# Patient Record
Sex: MALE | Race: ASIAN | NOT HISPANIC OR LATINO
[De-identification: names, ages, dates, MRNs, and addresses within clinical notes are randomized per-mention and may not be internally consistent; named-entity substitution may affect disease eponyms.]

---

## 2020-08-19 ENCOUNTER — APPOINTMENT (OUTPATIENT)
Dept: ORTHOPEDIC SURGERY | Facility: CLINIC | Age: 40
End: 2020-08-19

## 2020-08-19 PROBLEM — Z00.00 ENCOUNTER FOR PREVENTIVE HEALTH EXAMINATION: Status: ACTIVE | Noted: 2020-08-19

## 2021-12-18 ENCOUNTER — EMERGENCY (EMERGENCY)
Facility: HOSPITAL | Age: 41
LOS: 1 days | Discharge: HOME | End: 2021-12-18
Attending: EMERGENCY MEDICINE
Payer: MEDICAID

## 2021-12-18 VITALS
TEMPERATURE: 98 F | SYSTOLIC BLOOD PRESSURE: 143 MMHG | RESPIRATION RATE: 18 BRPM | OXYGEN SATURATION: 98 % | HEART RATE: 97 BPM | HEIGHT: 71 IN | WEIGHT: 175.05 LBS | DIASTOLIC BLOOD PRESSURE: 89 MMHG

## 2021-12-18 PROCEDURE — 70450 CT HEAD/BRAIN W/O DYE: CPT | Mod: MA

## 2021-12-18 PROCEDURE — 90471 IMMUNIZATION ADMIN: CPT

## 2021-12-18 PROCEDURE — 99284 EMERGENCY DEPT VISIT MOD MDM: CPT

## 2021-12-18 PROCEDURE — 70450 CT HEAD/BRAIN W/O DYE: CPT | Mod: 26,MA

## 2021-12-18 PROCEDURE — 90715 TDAP VACCINE 7 YRS/> IM: CPT

## 2021-12-18 PROCEDURE — 99284 EMERGENCY DEPT VISIT MOD MDM: CPT | Mod: 25

## 2021-12-18 RX ORDER — ONDANSETRON 8 MG/1
4 TABLET, FILM COATED ORAL ONCE
Refills: 0 | Status: COMPLETED | OUTPATIENT
Start: 2021-12-18 | End: 2021-12-18

## 2021-12-18 RX ORDER — ACETAMINOPHEN 500 MG
975 TABLET ORAL ONCE
Refills: 0 | Status: COMPLETED | OUTPATIENT
Start: 2021-12-18 | End: 2021-12-18

## 2021-12-18 RX ORDER — TETANUS TOXOID, REDUCED DIPHTHERIA TOXOID AND ACELLULAR PERTUSSIS VACCINE, ADSORBED 5; 2.5; 8; 8; 2.5 [IU]/.5ML; [IU]/.5ML; UG/.5ML; UG/.5ML; UG/.5ML
0.5 SUSPENSION INTRAMUSCULAR ONCE
Refills: 0 | Status: COMPLETED | OUTPATIENT
Start: 2021-12-18 | End: 2021-12-18

## 2021-12-18 RX ADMIN — Medication 975 MILLIGRAM(S): at 22:17

## 2021-12-18 RX ADMIN — ONDANSETRON 4 MILLIGRAM(S): 8 TABLET, FILM COATED ORAL at 22:17

## 2021-12-18 RX ADMIN — TETANUS TOXOID, REDUCED DIPHTHERIA TOXOID AND ACELLULAR PERTUSSIS VACCINE, ADSORBED 0.5 MILLILITER(S): 5; 2.5; 8; 8; 2.5 SUSPENSION INTRAMUSCULAR at 22:17

## 2021-12-18 NOTE — ED PROVIDER NOTE - NS ED ROS FT
Constitutional: no fevers; no chills  HEENT: no visual changes, no sore throat, no rhinorrhea  CV: no cp; no palpitations  Resp: no sob; no cough  GI: no abd pain, no nausea, no vomiting, no diarrhea, no constipation  : no dysuria, no hematuria  MSK: no myalgias; no arthralgias  skin: no rashes  neuro: HA, no numbness; no weakness, no tingling  endo: no polyuria, no polydipsia

## 2021-12-18 NOTE — ED PROVIDER NOTE - CLINICAL SUMMARY MEDICAL DECISION MAKING FREE TEXT BOX
Luiz Ro MD. pt presents c/o left head pain after weight barbell hit his head. pt denies use of ac or antiplatelets. +LOC. nausea and blurred vision but denies vomiting. denies numbness, weakness, or tingling of extremities. pt neurologically intact. has a small scalp hematoma w/ abrasion (no lac). no midline vertebral tenderness. FROM of neck. no other signs of trauma. will obtain CTH, adacel, analgesia, reassess

## 2021-12-18 NOTE — ED PROVIDER NOTE - NSFOLLOWUPINSTRUCTIONS_ED_ALL_ED_FT
There are many types of head injuries. They can be as minor as a bump. Some head injuries can be worse. Worse injuries include:    A strong hit to the head that hurts the brain (concussion).  A bruise of the brain (contusion). This means there is bleeding in the brain that can cause swelling.  A cracked skull (skull fracture).  Bleeding in the brain that gathers, gets thick (makes a clot), and forms a bump (hematoma).    Most problems from a head injury come in the first 24 hours. However, you may still have side effects up to 7–10 days after the injury. It is important to watch your condition for any changes.    Follow these instructions at home:  Medicines     Take Tylenol 1g every six hours and supplement with ibuprofen 600mg, with food, every six hours which can be taken three hours apart from the Tylenol to have a layered effect   Have your child:    Rest as much as possible. Rest helps the brain heal.  Avoid activities that are hard or tiring.    Make sure you get enough sleep.  Limit activities that need a lot of thought or attention, such as:    Watching TV.  Playing memory games and puzzles.  Doing homework.  Working on the computer, social media, and texting.    Stay away from activities that could cause another head injury, such as:    Riding a bicycle.  Playing sports.  Playing in gym class or recess.    Ask your doctor when it is safe for you to return to normal activities. You can follow up with our Sports team (Follow up with [facility] at [address] [place] [address] [address], [place], [place], [address], Call [phone] and arrange an appointment. They are open on Tuesdays from 2pm-9pm, call ahead and arrange insurance and/or referral needs. Appointments are available Tuesdays from 2:00pm-8pm) or neurology (Follow up with your neurologist in 2-3 days or call our clinic at [phone].)    General instructions     Watch your condition carefully for symptoms that are new or getting worse. This is very important in the first 24 hours after the head injury.  Keep all follow-up visits. This is important.    Wear a seatbelt when in a moving vehicle.  Wear a helmet when:    Riding a bicycle.  Skiing.  Doing any other sport or activity that has a risk of injury.    Get help right away if:    You have a very bad (severe) headache that is not helped by medicine.  Clear or bloody fluid coming from the nose or ears.  Changes in vision.  Jerky movements that you cannot control.    Your symptoms get worse.  You throw up (vomit).  Your dizziness gets worse.  You cannot walk or do not have control over his or her arms or legs.  You pass out.  You are sleepier or have trouble staying awake.  Your will not eat or drink.  The black centers of your eyes (pupils) change in size compared to each other.  These symptoms may be an emergency. Do not wait to see if the symptoms will go away. Get medical help right away. Call your local emergency services (911 in the [place]

## 2021-12-18 NOTE — ED PROVIDER NOTE - ATTENDING CONTRIBUTION TO CARE
Benja Morrison MD, FACEP: In this physician's medical judgement based on clinical history and physical exam, patient with head injury without loss of consciousness. Moderate symptoms with mild nausea which subsided.   CN 2 normal sight, 3,4,6 eomi and eyelid movement normal, 5,7 normal gritting of teeth and opening of mouth and sensation to face, 8 normal hearing for patient, 9,10 normal swallowing and phonation, 11 normal shoulder shrug, 12 normal tongue movement and articulation, normal coordination, normal finger to nose, normal heel to shin, negative Romberg, no pronator drift, no gait instability, 5/5 strength in upper and lower extremities, normal sensory throughout, alert and oriented to person, place, time, and situation.   will offer analgesia, ct head and reassess  Will follow up on analgesia, imaging, reassess and disposition as clinically indicated.     *The above represents an initial assessment/impression. Please refer to progress notes for potential changes in patient clinical course*     ct head within normal limits   The patient was serially evaluated throughout emergency department course. There was no acute deterioration up to this time in the department. Patient has demonstrated clinical improvement and is stable, feels better at this time according to emergency department team. Agree with goals/plan of emergency department care as described in this physician's electronic medical record, including diagnostics, therapeutics and consultation as clinically warranted. Will discharge home with close outpatient follow up with primary care physician/provider and specialist if necessary. The patient and/or family was educated on concerning signs and features to return to the emergency department, in layman terms, including but not limited to: nausea, vomiting, fever, chills, persistent/worsening symptoms or any concerns at all. No immediate life threatening issues present on history, clinical exam, or any diagnostic evaluation. The patient is a safe disposition home, has capacity and insight into their condition, is ambulatory in the Emergency Department with no further questions and will follow up with their doctor(s) this week. Diagnosis, prognosis, natural history and treatment was discussed with patient and/or family. The patient and/or family were given the opportunity to ask questions and have them answered in full. The patient and/or family are with capacity and insight into the situation, treatment, risks, benefits, alternative therapies, and understand that they can ask any further questions if needed. Patient and/or family/guardian understands anticipatory guidance and was given strict return and follow up precautions. The patient and/or family/guardian has been informed, in layman terms, of all concerning signs and symptoms to return to Emergency Department, the necessity to follow up with the PMD/Clinic/follow up provided within 2-3 days was explained, and the patient and/or family/guardian reports understanding of above with capacity and insight. The patient and/or family/guardian were informed of any results of their tests and are were encouraged to follow up on the findings with their doctor as well as the need to inform their doctor of any results. The patient and/or family/guardian are aware of the need to follow up with repeat testing as applicable and report understanding of the above with capacity and insight. The patient and/or family/guardian was made aware of any pending test results at the time of discharge and of the need to call back for the final results a well as the need to inform their doctor of the results.

## 2021-12-18 NOTE — ED ADULT NURSE NOTE - OBJECTIVE STATEMENT
41 y male presents from home s/p head injury while working out. reports to dropping barbell onto head. unsure of amount of weight- reports to dizziness nausea without vomiting and HA. denies cp, sob, fevers, chills. a&ox3. skin warm and dry. breathing comfortably in bed- no distress. abdomen soft nondistended. safety maintained- bed locked in lowest position. vss.

## 2021-12-18 NOTE — ED PROVIDER NOTE - OBJECTIVE STATEMENT
40 yo M PMHX PUD, colonic polyps, presents to the ED c/o left sided head pain after a barbell hit the top of his head. He felt his left shoulder give out, causing the barbell to hit his head. He states he had LOC and felt very dazed afterwards. He is c/o nausea and blurry vision. He denies vomiting, neck pain, numbness, weakness tingling, abd pain, fevers, weakness, cp, sob. Pt denies use of anti-platelet or anticoagulant medications

## 2021-12-18 NOTE — ED PROVIDER NOTE - PHYSICAL EXAMINATION
PHYSICAL EXAM:  GENERAL: non-toxic appearing; in no respiratory distress  HEAD: on the left scalp, there is a small approximately 0.5cm x 0.5cm area of swelling and tenderness; small abrasion with NO laceration in the same area; no further bleeding;   NECK: No JVD; trachea midline; no midline cervical tenderness; FROM of neck  EYES: PERRL, EOMs intact b/l w/out deficits; normal conjunctiva  CHEST/LUNG: CTAB no wheezes/rhonchi/rales  HEART: RRR no murmur/gallops/rubs  ABDOMEN: +BS, soft, NT, ND  EXTREMITIES: No LE edema, +2 radial pulses b/l, +2 DP/PT pulses b/l  MUSCULOSKELETAL: FROM of all 4 extremities; no midline vertebral tenderness;   NERVOUS SYSTEM:  A&Ox3, No motor deficits or sensory deficits; CNII-XII intact; Speech is fluent and appropriate  SKIN:  Warm and dry as visualized
Home

## 2021-12-18 NOTE — ED PROVIDER NOTE - PATIENT PORTAL LINK FT
You can access the FollowMyHealth Patient Portal offered by [facility] by registering at the following website: [email or web address]. By joining [facility]’s FollowMyHealth portal, you will also be able to view your health information using other applications (apps) compatible with our system.

## 2022-10-27 ENCOUNTER — LABORATORY RESULT (OUTPATIENT)
Age: 42
End: 2022-10-27

## 2022-10-27 ENCOUNTER — APPOINTMENT (OUTPATIENT)
Dept: ENDOCRINOLOGY | Facility: CLINIC | Age: 42
End: 2022-10-27

## 2022-10-27 VITALS
DIASTOLIC BLOOD PRESSURE: 62 MMHG | TEMPERATURE: 97.8 F | HEART RATE: 98 BPM | WEIGHT: 190 LBS | SYSTOLIC BLOOD PRESSURE: 102 MMHG | OXYGEN SATURATION: 97 %

## 2022-10-27 PROCEDURE — 99204 OFFICE O/P NEW MOD 45 MIN: CPT

## 2022-10-27 NOTE — ASSESSMENT
[FreeTextEntry1] : #Hyperprolactinemia\par -Patient noted to have mild elevation in his prolactin level when he had his previous labs done in the setting of mild gynecomastia.  Patient denies any galactorrhea.\par -Given mild elevation up to 26, less likely that patient has a prolactinoma that is large enough that is causing his headaches.\par Plan:\par -We will recheck patient's labs at this time.  Ideal time to check his testosterone levels at 8 AM however patient is here in the office and would like to get his labs done.  We will check his levels today however if levels are low, will have patient recheck his testosterone level at 8 AM in the morning.\par -We will call patient with results\par -Advised patient not to take over-the-counter testosterone supplements as they are not FDA regulated and it is unclear how much testosterone is in them

## 2022-10-27 NOTE — HISTORY OF PRESENT ILLNESS
[FreeTextEntry1] : #Hyperprolactinemia\par -Patient states that he has been having some increased breast tissue that he has noticed bilaterally.  Denies any discharge from the breast tissue.  Also mentioned that he was having some headaches recently for which she went to see his primary care doctor.  He states that he had some labs done recently which showed an elevated prolactin level for which she is here to follow-up.\par -Denies any pain at his breast.  States that it just cosmetically is bothering him.\par -He does mention some decreased libido, decreased energy recently.\par -He has 2 children\par -was trying testosterone supplements after noted to have low testo however has been off of it for over a month\par \par FSH 4.7\par LH 4.6\par Estradiol 20.6\par Prolactin 27.1\par Testosterone 284\par Free testosterone 71.9\par \par \par

## 2022-10-31 ENCOUNTER — NON-APPOINTMENT (OUTPATIENT)
Age: 42
End: 2022-10-31

## 2022-11-01 ENCOUNTER — NON-APPOINTMENT (OUTPATIENT)
Age: 42
End: 2022-11-01

## 2022-11-01 LAB
ANION GAP SERPL CALC-SCNC: 15 MMOL/L
BASOPHILS # BLD AUTO: 0.04 K/UL
BASOPHILS NFR BLD AUTO: 0.6 %
BUN SERPL-MCNC: 9 MG/DL
CALCIUM SERPL-MCNC: 10.3 MG/DL
CHLORIDE SERPL-SCNC: 101 MMOL/L
CO2 SERPL-SCNC: 25 MMOL/L
CREAT SERPL-MCNC: 0.91 MG/DL
EGFR: 108 ML/MIN/1.73M2
EOSINOPHIL # BLD AUTO: 0.19 K/UL
EOSINOPHIL NFR BLD AUTO: 2.7 %
FSH SERPL-MCNC: 5.2 IU/L
GLUCOSE SERPL-MCNC: 122 MG/DL
HCT VFR BLD CALC: 46.7 %
HGB BLD-MCNC: 15.1 G/DL
IMM GRANULOCYTES NFR BLD AUTO: 0.3 %
LH SERPL-ACNC: 7.9 IU/L
LYMPHOCYTES # BLD AUTO: 2.65 K/UL
LYMPHOCYTES NFR BLD AUTO: 37.1 %
MAN DIFF?: NORMAL
MCHC RBC-ENTMCNC: 27.5 PG
MCHC RBC-ENTMCNC: 32.3 GM/DL
MCV RBC AUTO: 85.1 FL
MONOCYTES # BLD AUTO: 0.63 K/UL
MONOCYTES NFR BLD AUTO: 8.8 %
NEUTROPHILS # BLD AUTO: 3.61 K/UL
NEUTROPHILS NFR BLD AUTO: 50.5 %
PLATELET # BLD AUTO: 354 K/UL
POTASSIUM SERPL-SCNC: 4.2 MMOL/L
PROLACTIN SERPL-MCNC: 24.6 NG/ML
RBC # BLD: 5.49 M/UL
RBC # FLD: 14.5 %
SHBG SERPL-SCNC: 14.8 NMOL/L
SODIUM SERPL-SCNC: 141 MMOL/L
T4 FREE SERPL-MCNC: 1.2 NG/DL
TESTOST FREE SERPL-MCNC: 5.2 PG/ML
TESTOST SERPL-MCNC: 219 NG/DL
TSH SERPL-ACNC: 0.85 UIU/ML
WBC # FLD AUTO: 7.14 K/UL

## 2022-11-15 ENCOUNTER — NON-APPOINTMENT (OUTPATIENT)
Age: 42
End: 2022-11-15

## 2022-11-15 LAB
ACTH SER-ACNC: 36.4 PG/ML
ANION GAP SERPL CALC-SCNC: 14 MMOL/L
BUN SERPL-MCNC: 12 MG/DL
CALCIUM SERPL-MCNC: 9.8 MG/DL
CHLORIDE SERPL-SCNC: 104 MMOL/L
CO2 SERPL-SCNC: 23 MMOL/L
CORTIS SERPL-MCNC: 16.6 UG/DL
CREAT SERPL-MCNC: 0.99 MG/DL
EGFR: 98 ML/MIN/1.73M2
FSH SERPL-MCNC: 4.4 IU/L
GH SERPL-MCNC: 0.3 NG/ML
GLUCOSE SERPL-MCNC: 100 MG/DL
IGF-1 INTERP: NORMAL
IGF-I BLD-MCNC: 104 NG/ML
LH SERPL-ACNC: 4.2 IU/L
MONOMERIC PROLACTIN (ICMA)*: 17.4 NG/ML
PERCENT MACROPROLACTIN: 18 %
POTASSIUM SERPL-SCNC: 4.9 MMOL/L
PROLACTIN SERPL-MCNC: 19.9 NG/ML
PROLACTIN SERPL-MCNC: 20.6 NG/ML
PROLACTIN, SERUM (ICMA)*: 21.3 NG/ML
SODIUM SERPL-SCNC: 141 MMOL/L
T4 FREE SERPL-MCNC: 1 NG/DL
TESTOST FREE SERPL-MCNC: 10.7 PG/ML
TESTOST SERPL-MCNC: 287 NG/DL
TSH SERPL-ACNC: 1.21 UIU/ML

## 2023-01-14 ENCOUNTER — APPOINTMENT (OUTPATIENT)
Dept: MRI IMAGING | Facility: IMAGING CENTER | Age: 43
End: 2023-01-14
Payer: MEDICAID

## 2023-01-14 ENCOUNTER — OUTPATIENT (OUTPATIENT)
Dept: OUTPATIENT SERVICES | Facility: HOSPITAL | Age: 43
LOS: 1 days | End: 2023-01-14
Payer: MEDICAID

## 2023-01-14 DIAGNOSIS — E22.1 HYPERPROLACTINEMIA: ICD-10-CM

## 2023-01-14 PROCEDURE — A9585: CPT

## 2023-01-14 PROCEDURE — 70553 MRI BRAIN STEM W/O & W/DYE: CPT

## 2023-01-14 PROCEDURE — 70553 MRI BRAIN STEM W/O & W/DYE: CPT | Mod: 26

## 2023-04-27 ENCOUNTER — RX RENEWAL (OUTPATIENT)
Age: 43
End: 2023-04-27

## 2023-06-26 ENCOUNTER — RX RENEWAL (OUTPATIENT)
Age: 43
End: 2023-06-26

## 2023-08-28 ENCOUNTER — RX RENEWAL (OUTPATIENT)
Age: 43
End: 2023-08-28

## 2023-10-23 ENCOUNTER — RX RENEWAL (OUTPATIENT)
Age: 43
End: 2023-10-23

## 2023-11-13 ENCOUNTER — APPOINTMENT (OUTPATIENT)
Dept: ENDOCRINOLOGY | Facility: CLINIC | Age: 43
End: 2023-11-13
Payer: MEDICAID

## 2023-11-13 VITALS
BODY MASS INDEX: 26.82 KG/M2 | OXYGEN SATURATION: 97 % | HEIGHT: 72 IN | TEMPERATURE: 98 F | DIASTOLIC BLOOD PRESSURE: 65 MMHG | SYSTOLIC BLOOD PRESSURE: 100 MMHG | HEART RATE: 80 BPM | WEIGHT: 198 LBS

## 2023-11-13 PROCEDURE — 99214 OFFICE O/P EST MOD 30 MIN: CPT

## 2023-12-18 ENCOUNTER — RX RENEWAL (OUTPATIENT)
Age: 43
End: 2023-12-18

## 2023-12-21 ENCOUNTER — NON-APPOINTMENT (OUTPATIENT)
Age: 43
End: 2023-12-21

## 2024-01-02 LAB
ACTH SER-ACNC: 39.4 PG/ML
CORTIS SERPL-MCNC: 15.7 UG/DL
ESTRADIOL SERPL-MCNC: 31 PG/ML
FSH SERPL-MCNC: 5.2 IU/L
HCG-TM SERPL-MCNC: <1 MIU/ML
LH SERPL-ACNC: 9.2 IU/L
PROLACTIN SERPL-MCNC: 9.8 NG/ML
TESTOST SERPL-MCNC: 384 NG/DL
TSH SERPL-ACNC: 1.74 UIU/ML

## 2024-01-16 ENCOUNTER — RX RENEWAL (OUTPATIENT)
Age: 44
End: 2024-01-16

## 2024-01-16 RX ORDER — CABERGOLINE 0.5 MG/1
0.5 TABLET ORAL
Qty: 4 | Refills: 3 | Status: ACTIVE | COMMUNITY
Start: 2023-01-26 | End: 1900-01-01

## 2024-03-22 ENCOUNTER — APPOINTMENT (OUTPATIENT)
Dept: ENDOCRINOLOGY | Facility: CLINIC | Age: 44
End: 2024-03-22
Payer: COMMERCIAL

## 2024-03-22 VITALS
BODY MASS INDEX: 26.68 KG/M2 | TEMPERATURE: 97.9 F | HEART RATE: 74 BPM | OXYGEN SATURATION: 98 % | WEIGHT: 197 LBS | DIASTOLIC BLOOD PRESSURE: 70 MMHG | SYSTOLIC BLOOD PRESSURE: 118 MMHG | HEIGHT: 72 IN

## 2024-03-22 DIAGNOSIS — E22.1 HYPERPROLACTINEMIA: ICD-10-CM

## 2024-03-22 PROCEDURE — 99214 OFFICE O/P EST MOD 30 MIN: CPT

## 2024-03-22 NOTE — HISTORY OF PRESENT ILLNESS
[FreeTextEntry1] : #Hyperprolactinemia Prior history: -Patient states that he has been having some increased breast tissue that he has noticed bilaterally.  Denies any discharge from the breast tissue.  Also mentioned that he was having some headaches recently for which she went to see his primary care doctor.  He states that he had some labs done recently which showed an elevated prolactin level for which she is here to follow-up. -Denies any pain at his breast.  States that it just cosmetically is bothering him. - In january 2023, patient started cabergoline- never got repeat labs after -Still having some decreased libido  -He has 2 children -was trying testosterone supplements in the past after noted to have low testo however has been off of it for over a month  MRI- 3mm pituitary adenoma  Nov 2022 FSH 4.4 LH 4.2 Estradiol 20.6 Prolactin 20.6 Testosterone 287 Cortisol 16.6  Dec 2024 Testo 384

## 2024-03-22 NOTE — ASSESSMENT
[FreeTextEntry1] : #Hyperprolactinemia - Patient initially found to have increased breast tissue in the past and subsequently found to have mild increase in prolactin in the setting of low testo  - Has been on cabergoline since Jan 2023 with improvement in prolactin and testo Plan: - Get prolactin, LH, FSH and Testo, TSH levels checked - will call with results

## 2024-03-28 LAB
ALBUMIN SERPL ELPH-MCNC: 4.6 G/DL
ALP BLD-CCNC: 67 U/L
ALT SERPL-CCNC: 12 U/L
ANION GAP SERPL CALC-SCNC: 11 MMOL/L
AST SERPL-CCNC: 17 U/L
BILIRUB SERPL-MCNC: 0.4 MG/DL
BUN SERPL-MCNC: 9 MG/DL
CALCIUM SERPL-MCNC: 9.9 MG/DL
CHLORIDE SERPL-SCNC: 102 MMOL/L
CO2 SERPL-SCNC: 25 MMOL/L
CREAT SERPL-MCNC: 0.88 MG/DL
EGFR: 109 ML/MIN/1.73M2
FSH SERPL-MCNC: 4.9 IU/L
GLUCOSE SERPL-MCNC: 114 MG/DL
LH SERPL-ACNC: 9.6 IU/L
POTASSIUM SERPL-SCNC: 4.2 MMOL/L
PROLACTIN SERPL-MCNC: 10.3 NG/ML
PROT SERPL-MCNC: 7.7 G/DL
SHBG SERPL-SCNC: 18.8 NMOL/L
SODIUM SERPL-SCNC: 138 MMOL/L
TESTOST FREE SERPL-MCNC: 8.1 PG/ML
TESTOST SERPL-MCNC: 369 NG/DL
TSH SERPL-ACNC: 1.55 UIU/ML

## 2025-02-26 ENCOUNTER — APPOINTMENT (OUTPATIENT)
Dept: ENDOCRINOLOGY | Facility: CLINIC | Age: 45
End: 2025-02-26
Payer: MEDICAID

## 2025-02-26 VITALS
WEIGHT: 208 LBS | HEIGHT: 72 IN | OXYGEN SATURATION: 98 % | BODY MASS INDEX: 28.17 KG/M2 | DIASTOLIC BLOOD PRESSURE: 79 MMHG | HEART RATE: 97 BPM | SYSTOLIC BLOOD PRESSURE: 124 MMHG

## 2025-02-26 DIAGNOSIS — E22.1 HYPERPROLACTINEMIA: ICD-10-CM

## 2025-02-26 PROCEDURE — 99214 OFFICE O/P EST MOD 30 MIN: CPT

## 2025-04-15 ENCOUNTER — NON-APPOINTMENT (OUTPATIENT)
Age: 45
End: 2025-04-15